# Patient Record
Sex: MALE | Race: WHITE | NOT HISPANIC OR LATINO | Employment: FULL TIME | URBAN - NONMETROPOLITAN AREA
[De-identification: names, ages, dates, MRNs, and addresses within clinical notes are randomized per-mention and may not be internally consistent; named-entity substitution may affect disease eponyms.]

---

## 2017-07-06 ENCOUNTER — APPOINTMENT (OUTPATIENT)
Dept: GENERAL RADIOLOGY | Facility: HOSPITAL | Age: 42
End: 2017-07-06

## 2017-07-06 ENCOUNTER — HOSPITAL ENCOUNTER (EMERGENCY)
Facility: HOSPITAL | Age: 42
Discharge: HOME | End: 2017-07-06
Attending: EMERGENCY MEDICINE | Admitting: EMERGENCY MEDICINE

## 2017-07-06 VITALS
BODY MASS INDEX: 20.3 KG/M2 | HEART RATE: 89 BPM | TEMPERATURE: 98.1 F | OXYGEN SATURATION: 97 % | WEIGHT: 145 LBS | DIASTOLIC BLOOD PRESSURE: 79 MMHG | RESPIRATION RATE: 18 BRPM | SYSTOLIC BLOOD PRESSURE: 135 MMHG | HEIGHT: 71 IN

## 2017-07-06 DIAGNOSIS — R07.89 RIGHT-SIDED CHEST WALL PAIN: Primary | ICD-10-CM

## 2017-07-06 DIAGNOSIS — S22.31XA CLOSED FRACTURE OF ONE RIB OF RIGHT SIDE, INITIAL ENCOUNTER: ICD-10-CM

## 2017-07-06 PROCEDURE — 99283 EMERGENCY DEPT VISIT LOW MDM: CPT

## 2017-07-06 PROCEDURE — 71020 HC CHEST PA AND LATERAL: CPT

## 2017-07-06 RX ORDER — TRAMADOL HYDROCHLORIDE 50 MG/1
50 TABLET ORAL EVERY 6 HOURS PRN
Qty: 20 TABLET | Refills: 0 | OUTPATIENT
Start: 2017-07-06 | End: 2019-07-14

## 2017-07-06 RX ORDER — OXYCODONE HYDROCHLORIDE 5 MG/1
5 TABLET ORAL EVERY 4 HOURS PRN
COMMUNITY
End: 2019-07-14

## 2017-07-06 RX ORDER — OXYCODONE AND ACETAMINOPHEN 10; 325 MG/1; MG/1
1 TABLET ORAL EVERY 6 HOURS PRN
Qty: 12 TABLET | Refills: 0 | OUTPATIENT
Start: 2017-07-06 | End: 2019-07-14

## 2017-07-06 RX ORDER — TRAMADOL HYDROCHLORIDE 50 MG/1
50 TABLET ORAL EVERY 6 HOURS PRN
COMMUNITY
End: 2017-07-12 | Stop reason: SDUPTHER

## 2017-07-06 RX ORDER — ACYCLOVIR 400 MG/1
400 TABLET ORAL DAILY
COMMUNITY
End: 2019-07-14
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
## 2017-07-12 ENCOUNTER — OFFICE VISIT (OUTPATIENT)
Dept: FAMILY MEDICINE CLINIC | Facility: CLINIC | Age: 42
End: 2017-07-12

## 2017-07-12 VITALS
OXYGEN SATURATION: 98 % | HEART RATE: 78 BPM | WEIGHT: 145 LBS | HEIGHT: 71 IN | DIASTOLIC BLOOD PRESSURE: 80 MMHG | BODY MASS INDEX: 20.3 KG/M2 | SYSTOLIC BLOOD PRESSURE: 110 MMHG

## 2017-07-12 DIAGNOSIS — S22.31XG CLOSED FRACTURE OF ONE RIB OF RIGHT SIDE WITH DELAYED HEALING, SUBSEQUENT ENCOUNTER: ICD-10-CM

## 2017-07-12 DIAGNOSIS — S32.301A CLOSED NONDISPLACED FRACTURE OF RIGHT ILIUM, UNSPECIFIED FRACTURE MORPHOLOGY, INITIAL ENCOUNTER (HCC): Primary | ICD-10-CM

## 2017-07-12 PROBLEM — S32.9XXA PELVIC FRACTURE (HCC): Status: ACTIVE | Noted: 2017-07-12

## 2017-07-12 PROCEDURE — 99203 OFFICE O/P NEW LOW 30 MIN: CPT | Performed by: FAMILY MEDICINE

## 2017-07-12 RX ORDER — CYCLOBENZAPRINE HCL 10 MG
10 TABLET ORAL 3 TIMES DAILY PRN
Qty: 30 TABLET | Refills: 3 | OUTPATIENT
Start: 2017-07-12 | End: 2019-07-14

## 2017-07-12 RX ORDER — MELOXICAM 15 MG/1
15 TABLET ORAL DAILY
Qty: 45 TABLET | Refills: 3 | OUTPATIENT
Start: 2017-07-12 | End: 2019-07-14
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
## 2017-07-13 NOTE — PATIENT INSTRUCTIONS
Rib Fracture  A rib fracture is a break or crack in one of the bones of the ribs. The ribs are a group of long, curved bones that wrap around your chest and attach to your spine. They protect your lungs and other organs in the chest cavity. A broken or cracked rib is often painful, but most do not cause other problems. Most rib fractures heal on their own over time. However, rib fractures can be more serious if multiple ribs are broken or if broken ribs move out of place and push against other structures.  CAUSES   · A direct blow to the chest. For example, this could happen during contact sports, a car accident, or a fall against a hard object.  · Repetitive movements with high force, such as pitching a baseball or having severe coughing spells.  SYMPTOMS   · Pain when you breathe in or cough.  · Pain when someone presses on the injured area.  DIAGNOSIS   Your caregiver will perform a physical exam. Various imaging tests may be ordered to confirm the diagnosis and to look for related injuries. These tests may include a chest X-ray, computed tomography (CT), magnetic resonance imaging (MRI), or a bone scan.  TREATMENT   Rib fractures usually heal on their own in 1-3 months. The longer healing period is often associated with a continued cough or other aggravating activities. During the healing period, pain control is very important. Medication is usually given to control pain. Hospitalization or surgery may be needed for more severe injuries, such as those in which multiple ribs are broken or the ribs have moved out of place.   HOME CARE INSTRUCTIONS   · Avoid strenuous activity and any activities or movements that cause pain. Be careful during activities and avoid bumping the injured rib.  · Gradually increase activity as directed by your caregiver.  · Only take over-the-counter or prescription medications as directed by your caregiver. Do not take other medications without asking your caregiver first.  · Apply ice  to the injured area for the first 1-2 days after you have been treated or as directed by your caregiver. Applying ice helps to reduce inflammation and pain.    Put ice in a plastic bag.    Place a towel between your skin and the bag.      Leave the ice on for 15-20 minutes at a time, every 2 hours while you are awake.  · Perform deep breathing as directed by your caregiver. This will help prevent pneumonia, which is a common complication of a broken rib. Your caregiver may instruct you to:    Take deep breaths several times a day.    Try to cough several times a day, holding a pillow against the injured area.    Use a device called an incentive spirometer to practice deep breathing several times a day.  · Drink enough fluids to keep your urine clear or pale yellow. This will help you avoid constipation.    · Do not wear a rib belt or binder. These restrict breathing, which can lead to pneumonia.    SEEK IMMEDIATE MEDICAL CARE IF:   · You have a fever.    · You have difficulty breathing or shortness of breath.    · You develop a continual cough, or you cough up thick or bloody sputum.  · You feel sick to your stomach (nausea), throw up (vomit), or have abdominal pain.    · You have worsening pain not controlled with medications.    MAKE SURE YOU:  · Understand these instructions.  · Will watch your condition.  · Will get help right away if you are not doing well or get worse.     This information is not intended to replace advice given to you by your health care provider. Make sure you discuss any questions you have with your health care provider.     Document Released: 12/18/2006 Document Revised: 08/20/2014 Document Reviewed: 02/19/2014  [employer] Patient Education ©2017 [employer]

## 2019-07-13 ENCOUNTER — HOSPITAL ENCOUNTER (EMERGENCY)
Facility: HOSPITAL | Age: 44
Discharge: HOME | End: 2019-07-14
Attending: EMERGENCY MEDICINE | Admitting: EMERGENCY MEDICINE

## 2019-07-13 DIAGNOSIS — F19.10 SUBSTANCE ABUSE (HCC): ICD-10-CM

## 2019-07-13 DIAGNOSIS — R10.9 ABDOMINAL PAIN, UNSPECIFIED ABDOMINAL LOCATION: Primary | ICD-10-CM

## 2019-07-13 LAB
ALBUMIN SERPL-MCNC: 4.2 G/DL (ref 3.5–5.2)
ALBUMIN/GLOB SERPL: 1.7 G/DL
ALP SERPL-CCNC: 83 U/L (ref 39–117)
ALT SERPL W P-5'-P-CCNC: 15 U/L (ref 1–41)
ANION GAP SERPL CALCULATED.3IONS-SCNC: 13 MMOL/L (ref 5–15)
AST SERPL-CCNC: 17 U/L (ref 1–40)
BASOPHILS # BLD AUTO: 0.04 10*3/MM3 (ref 0–0.2)
BASOPHILS NFR BLD AUTO: 0.4 % (ref 0–1.5)
BILIRUB SERPL-MCNC: 0.3 MG/DL (ref 0.2–1.2)
BILIRUB UR QL STRIP: NEGATIVE
BUN BLD-MCNC: 17 MG/DL (ref 6–20)
BUN/CREAT SERPL: 20.7 (ref 7–25)
CALCIUM SPEC-SCNC: 9.3 MG/DL (ref 8.6–10.5)
CHLORIDE SERPL-SCNC: 102 MMOL/L (ref 98–107)
CLARITY UR: CLEAR
CO2 SERPL-SCNC: 26 MMOL/L (ref 22–29)
COLOR UR: YELLOW
CREAT BLD-MCNC: 0.82 MG/DL (ref 0.76–1.27)
DEPRECATED RDW RBC AUTO: 44.8 FL (ref 37–54)
EOSINOPHIL # BLD AUTO: 0.26 10*3/MM3 (ref 0–0.4)
EOSINOPHIL NFR BLD AUTO: 2.9 % (ref 0.3–6.2)
ERYTHROCYTE [DISTWIDTH] IN BLOOD BY AUTOMATED COUNT: 13.2 % (ref 12.3–15.4)
GFR SERPL CREATININE-BSD FRML MDRD: 103 ML/MIN/1.73
GLOBULIN UR ELPH-MCNC: 2.5 GM/DL
GLUCOSE BLD-MCNC: 113 MG/DL (ref 65–99)
GLUCOSE UR STRIP-MCNC: NEGATIVE MG/DL
HCT VFR BLD AUTO: 41.8 % (ref 37.5–51)
HGB BLD-MCNC: 14.4 G/DL (ref 13–17.7)
HGB UR QL STRIP.AUTO: NEGATIVE
IMM GRANULOCYTES # BLD AUTO: 0.02 10*3/MM3 (ref 0–0.05)
IMM GRANULOCYTES NFR BLD AUTO: 0.2 % (ref 0–0.5)
KETONES UR QL STRIP: NEGATIVE
LEUKOCYTE ESTERASE UR QL STRIP.AUTO: NEGATIVE
LIPASE SERPL-CCNC: 29 U/L (ref 13–60)
LYMPHOCYTES # BLD AUTO: 3.87 10*3/MM3 (ref 0.7–3.1)
LYMPHOCYTES NFR BLD AUTO: 42.9 % (ref 19.6–45.3)
MCH RBC QN AUTO: 32 PG (ref 26.6–33)
MCHC RBC AUTO-ENTMCNC: 34.4 G/DL (ref 31.5–35.7)
MCV RBC AUTO: 92.9 FL (ref 79–97)
MONOCYTES # BLD AUTO: 0.59 10*3/MM3 (ref 0.1–0.9)
MONOCYTES NFR BLD AUTO: 6.5 % (ref 5–12)
NEUTROPHILS # BLD AUTO: 4.24 10*3/MM3 (ref 1.7–7)
NEUTROPHILS NFR BLD AUTO: 47.1 % (ref 42.7–76)
NITRITE UR QL STRIP: NEGATIVE
NRBC BLD AUTO-RTO: 0 /100 WBC (ref 0–0.2)
PH UR STRIP.AUTO: 6 [PH] (ref 5–9)
PLATELET # BLD AUTO: 263 10*3/MM3 (ref 140–450)
PMV BLD AUTO: 10.4 FL (ref 6–12)
POTASSIUM BLD-SCNC: 3.7 MMOL/L (ref 3.5–5.2)
PROT SERPL-MCNC: 6.7 G/DL (ref 6–8.5)
PROT UR QL STRIP: NEGATIVE
RBC # BLD AUTO: 4.5 10*6/MM3 (ref 4.14–5.8)
SODIUM BLD-SCNC: 141 MMOL/L (ref 136–145)
SP GR UR STRIP: 1.04 (ref 1–1.03)
UROBILINOGEN UR QL STRIP: ABNORMAL
WBC NRBC COR # BLD: 9.02 10*3/MM3 (ref 3.4–10.8)

## 2019-07-13 PROCEDURE — 83690 ASSAY OF LIPASE: CPT

## 2019-07-13 PROCEDURE — 81003 URINALYSIS AUTO W/O SCOPE: CPT

## 2019-07-13 PROCEDURE — 85025 COMPLETE CBC W/AUTO DIFF WBC: CPT

## 2019-07-13 PROCEDURE — 99284 EMERGENCY DEPT VISIT MOD MDM: CPT

## 2019-07-13 PROCEDURE — 80307 DRUG TEST PRSMV CHEM ANLYZR: CPT | Performed by: EMERGENCY MEDICINE

## 2019-07-13 PROCEDURE — 80053 COMPREHEN METABOLIC PANEL: CPT

## 2019-07-13 RX ORDER — SODIUM CHLORIDE 0.9 % (FLUSH) 0.9 %
10 SYRINGE (ML) INJECTION AS NEEDED
Status: DISCONTINUED | OUTPATIENT
Start: 2019-07-13 | End: 2019-07-14 | Stop reason: HOSPADM

## 2019-07-14 ENCOUNTER — APPOINTMENT (OUTPATIENT)
Dept: CT IMAGING | Facility: HOSPITAL | Age: 44
End: 2019-07-14

## 2019-07-14 VITALS
BODY MASS INDEX: 23.01 KG/M2 | HEIGHT: 72 IN | DIASTOLIC BLOOD PRESSURE: 62 MMHG | RESPIRATION RATE: 18 BRPM | SYSTOLIC BLOOD PRESSURE: 103 MMHG | OXYGEN SATURATION: 97 % | TEMPERATURE: 96.8 F | WEIGHT: 169.9 LBS | HEART RATE: 49 BPM

## 2019-07-14 LAB
AMPHET+METHAMPHET UR QL: POSITIVE
BARBITURATES UR QL SCN: NEGATIVE
BENZODIAZ UR QL SCN: NEGATIVE
CANNABINOIDS SERPL QL: POSITIVE
COCAINE UR QL: NEGATIVE
HOLD SPECIMEN: NORMAL
HOLD SPECIMEN: NORMAL
METHADONE UR QL SCN: NEGATIVE
OPIATES UR QL: NEGATIVE
OXYCODONE UR QL SCN: POSITIVE
WHOLE BLOOD HOLD SPECIMEN: NORMAL
WHOLE BLOOD HOLD SPECIMEN: NORMAL

## 2019-07-14 PROCEDURE — 96374 THER/PROPH/DIAG INJ IV PUSH: CPT

## 2019-07-14 PROCEDURE — 25010000002 IOPAMIDOL 61 % SOLUTION: Performed by: EMERGENCY MEDICINE

## 2019-07-14 PROCEDURE — 74177 CT ABD & PELVIS W/CONTRAST: CPT

## 2019-07-14 PROCEDURE — 25010000002 KETOROLAC TROMETHAMINE PER 15 MG: Performed by: EMERGENCY MEDICINE

## 2019-07-14 RX ORDER — KETOROLAC TROMETHAMINE 30 MG/ML
30 INJECTION, SOLUTION INTRAMUSCULAR; INTRAVENOUS ONCE
Status: COMPLETED | OUTPATIENT
Start: 2019-07-14 | End: 2019-07-14

## 2019-07-14 RX ADMIN — SODIUM CHLORIDE 1000 ML: 900 INJECTION, SOLUTION INTRAVENOUS at 01:09

## 2019-07-14 RX ADMIN — IOPAMIDOL 90 ML: 612 INJECTION, SOLUTION INTRAVENOUS at 01:27

## 2019-07-14 RX ADMIN — KETOROLAC TROMETHAMINE 30 MG: 30 INJECTION, SOLUTION INTRAMUSCULAR at 01:10

## 2019-07-14 NOTE — ED PROVIDER NOTES
Subjective   44-year-old white male presents to the emergency department chief complaint of abdominal pain.  Patient complains of left upper quadrant and left lower quadrant abdominal pain off and on for 2 weeks.  He relates nothing makes it better or worse.  He denies fever sweats or chills.  He denies chest pain or shortness of breath.  He denies nausea vomiting or diarrhea.  He denies dysuria or hematuria.            Review of Systems   Constitutional: Negative for chills, diaphoresis and fever.   Respiratory: Negative for shortness of breath.    Cardiovascular: Negative for chest pain.   Gastrointestinal: Positive for abdominal pain. Negative for blood in stool, diarrhea, nausea and vomiting.   Genitourinary: Negative for dysuria and hematuria.   Musculoskeletal: Negative for back pain, gait problem and neck pain.   Neurological: Negative for syncope, weakness and headaches.   All other systems reviewed and are negative.      History reviewed. No pertinent past medical history.    No Known Allergies    History reviewed. No pertinent surgical history.    No family history on file.    Social History     Socioeconomic History   • Marital status: Single     Spouse name: Not on file   • Number of children: Not on file   • Years of education: Not on file   • Highest education level: Not on file   Tobacco Use   • Smoking status: Current Every Day Smoker     Packs/day: 1.00     Types: Cigarettes   • Smokeless tobacco: Never Used   Substance and Sexual Activity   • Alcohol use: No   • Drug use: Yes     Types: Marijuana, Methamphetamines           Objective   Physical Exam   Constitutional: He is oriented to person, place, and time. He appears well-developed and well-nourished.  Non-toxic appearance. He does not appear ill. No distress.   HENT:   Head: Normocephalic and atraumatic.   Mouth/Throat: Oropharynx is clear and moist.   Eyes: EOM are normal. Pupils are equal, round, and reactive to light.   Cardiovascular:  Normal rate, regular rhythm, normal heart sounds and intact distal pulses.   Pulmonary/Chest: Effort normal and breath sounds normal.   Abdominal: Soft. Normal appearance and bowel sounds are normal. There is tenderness in the left upper quadrant and left lower quadrant. There is no rigidity, no rebound and no guarding.   Neurological: He is alert and oriented to person, place, and time.   Skin: Skin is warm and dry.   Psychiatric: He has a normal mood and affect. His behavior is normal.   Nursing note and vitals reviewed.      Procedures           ED Course  ED Course as of Jul 14 0204   Sun Jul 14, 2019   0201 Patient is alert and resting comfortably. I reviewed the results of the emergency department evaluation with the patient.  I recommended primary care follow-up.  I advised the patient to return to the emergency department if their symptoms change or worsen.     [DR]      ED Course User Index  [DR] Floyd Alcantar MD      Labs Reviewed   COMPREHENSIVE METABOLIC PANEL - Abnormal; Notable for the following components:       Result Value    Glucose 113 (*)     All other components within normal limits    Narrative:     GFR Normal >60  Chronic Kidney Disease <60  Kidney Failure <15   URINALYSIS W/ MICROSCOPIC IF INDICATED (NO CULTURE) - Abnormal; Notable for the following components:    Specific Gravity, UA 1.039 (*)     All other components within normal limits    Narrative:     Urine microscopic not indicated.   CBC WITH AUTO DIFFERENTIAL - Abnormal; Notable for the following components:    Lymphocytes, Absolute 3.87 (*)     All other components within normal limits   URINE DRUG SCREEN - Abnormal; Notable for the following components:    Amphet/Methamphet, Screen Positive (*)     THC, Screen, Urine Positive (*)     Oxycodone Screen, Urine Positive (*)     All other components within normal limits    Narrative:     Negative Thresholds For Drugs Screened:     Amphetamines               500 ng/ml   Barbiturates                200 ng/ml   Benzodiazepines            100 ng/ml   Cocaine                    300 ng/ml   Methadone                  300 ng/ml   Opiates                    300 ng/ml   Oxycodone                  100 ng/ml   THC                        50 ng/ml    The Normal Value for all drugs tested is negative. This report includes final unconfirmed screening results to be used for medical treatment purposes only. Unconfirmed results must not be used for non-medical purposes such as employment or legal testing. Clinical consideration should be applied to any drug of abuse test, particulary when unconfirmed results are used.   LIPASE - Normal   RAINBOW DRAW    Narrative:     The following orders were created for panel order [place] Draw.  Procedure                               Abnormality         Status                     ---------                               -----------         ------                     Light Blue Top[[id number]]                                   Final result               Green Top (Gel)[[id number]]                                  Final result               Lavender Top[[id number]]                                     Final result               Gold Top - SST[[id number]]                                   Final result                 Please view results for these tests on the individual orders.   CBC AND DIFFERENTIAL    Narrative:     The following orders were created for panel order CBC & Differential.  Procedure                               Abnormality         Status                     ---------                               -----------         ------                     CBC Auto Differential[[id number]]        Abnormal            Final result                 Please view results for these tests on the individual orders.   LIGHT BLUE TOP   GREEN TOP   LAVENDER TOP   GOLD TOP - SST     Ct Abdomen Pelvis With Contrast    Result Date: 7/14/2019  Narrative: Exam CT abdomen pelvis with contrast INDICATION:  Abdominal pain TECHNIQUE: Routine CT abdomen pelvis with contrast. Sagittal coronal reconstructions were obtained. This exam was performed according to our departmental dose-optimization program, which includes automated exposure control, adjustment of the mA and/or kV according to patient size and/or use of iterative reconstruction technique. FINDINGS: There is o atelectasis in the posterior medial lungs. The liver, spleen, pancreas adrenal glands and kidneys are unremarkable. Gallbladder is contracted. No urinary tract calculus or hydronephrosis. The aorta is normal in caliber. No significant adenopathy. No bowel structure or obvious abnormal bowel wall thickening. There are surgical clips present at the base of the cecum. No abnormal stranding in the mesentery or ascites. Bladder is unremarkable. The bony structures are intact:     Impression: No obvious acute abnormality. Electronically signed by:  Brian Crane MD  7/14/2019 1:50 AM CDT Workstation: [id number]              MDM      Final diagnoses:   Abdominal pain, unspecified abdominal location   Substance abuse (CMS/HCC)            Floyd Alcantar MD  07/14/19 0204

## 2023-10-31 ENCOUNTER — NURSE TRIAGE (OUTPATIENT)
Dept: CALL CENTER | Facility: HOSPITAL | Age: 48
End: 2023-10-31

## 2023-11-01 NOTE — TELEPHONE ENCOUNTER
Reason for Disposition   Mild localized rash    Additional Information   Negative: [1] Sudden onset of rash (within last 2 hours) AND [2] difficulty breathing or swallowing   Negative: Sounds like a life-threatening emergency to the triager   Negative: Athlete's Foot suspected (i.e., itchy rash between the toes)   Negative: Impetigo suspected (i.e., painless infected superficial small sores, less than 1 inch or 2.5 cm, often covered by a soft, yellow-brown scab or crust; sometimes occurring near nasal openings)   Negative: Insect bite(s) suspected   Negative: Jock Itch suspected (i.e., itchy rash on inner thighs near genital area)   Negative: Localized lump (or swelling) without redness or rash   Negative: [1] Mpox suspected (e.g., direct skin contact such as sex, recent travel to [place] or [place]) AND [2] symptoms of Mpox (e.g., rash, fever, muscle aches, or swollen lymph nodes)   Negative: [1] At risk for Mpox (men-who-have-sex-with-men) AND [2] possible exposure (e.g., multiple sex partners in past 21 days) AND [3] symptoms of Mpox (e.g., rash, fever, muscle aches, or swollen lymph nodes)   Negative: Poison ivy, oak, or sumac rash suspected (e.g., itchy rash after contact with poison ivy)   Negative: Rash of female genital area  (e.g., labia, vagina, vulva)   Negative: Rash of male genital area (e.g., penis, scrotum)   Negative: Redness of immunization site   Negative: Ringworm suspected (i.e., round pink patch, sometimes looks like ring, usually 1/2 to 1 inch [12-25 mm],  in size, slowly increasing in size)   Negative: Shingles suspected (i.e., painful rash, multiple small blisters grouped together in one area of body; dermatomal distribution)   Negative: Small spot, skin growth, or mole   Negative: Sores or skin ulcer, not a rash   Negative: Wound infection suspected (i.e., pain, spreading redness, or pus; in a cut, puncture, scrape or sutured wound)   Negative: [1] Localized purple or blood-colored spots  "or dots AND [2] not from injury or friction AND [3] fever   Negative: Patient sounds very sick or weak to the triager   Negative: [1] Red area or streak AND [2] fever   Negative: [1] Rash is painful to touch AND [2] fever   Negative: [1] Looks infected (spreading redness, pus) AND [2] large red area (> 2 in. or 5 cm)   Negative: [1] Looks infected (spreading redness, pus) AND [2] diabetes mellitus or weak immune system (e.g., HIV positive, cancer chemo, splenectomy, organ transplant, chronic steroids)   Negative: [1] Localized purple or blood-colored spots or dots AND [2] not from injury or friction AND [3] no fever   Negative: [1] Looks infected (spreading redness, pus) AND [2] no fever   Negative: Looks like a boil, infected sore, deep ulcer or other infected rash   Negative: [1] Localized rash is very painful AND [2] no fever   Negative: Genital area rash   Negative: Lyme disease suspected (e.g., bull's eye rash or tick bite / exposure)   Negative: [1] Applying cream or ointment AND [2] causes severe itch, burning or pain   Negative: Medication patch causing local rash or itching   Negative: [1] Pimples (localized) AND [2 ] no improvement after using Care Advice   Negative: Tender bumps in armpits   Negative: [1] Severe localized itching AND [2] after 2 days of steroid cream   Negative: Localized rash present > 7 days   Negative: Red, moist, irritated area between skin folds (or under larger breasts)   Negative: [1] Localized area of skin darkening or thickening on lower legs or ankles AND [2] has NOT been evaluated by a doctor (or NP/PA)    Answer Assessment - Initial Assessment Questions  1. APPEARANCE of RASH: \"Describe the rash.\"       Raised area  2. LOCATION: \"Where is the rash located?\"       Left lower back  3. NUMBER: \"How many spots are there?\"       several  4. SIZE: \"How big are the spots?\" (Inches, centimeters or compare to size of a coin)       Unable to describe  5. ONSET: \"When did the rash " "start?\"       This am  6. ITCHING: \"Does the rash itch?\" If Yes, ask: \"How bad is the itch?\"  (Scale 0-10; or none, mild, moderate, severe)      moderate  7. PAIN: \"Does the rash hurt?\" If Yes, ask: \"How bad is the pain?\"  (Scale 0-10; or none, mild, moderate, severe)     - NONE (0): no pain     - MILD (1-3): doesn't interfere with normal activities      - MODERATE (4-7): interferes with normal activities or awakens from sleep      - SEVERE (8-10): excruciating pain, unable to do any normal activities      denies  8. OTHER SYMPTOMS: \"Do you have any other symptoms?\" (e.g., fever)      denies  9. PREGNANCY: \"Is there any chance you are pregnant?\" \"When was your last menstrual period?\"    Protocols used: Rash or Redness - Localized-ADULT-AH    "